# Patient Record
Sex: FEMALE | Race: WHITE | URBAN - METROPOLITAN AREA
[De-identification: names, ages, dates, MRNs, and addresses within clinical notes are randomized per-mention and may not be internally consistent; named-entity substitution may affect disease eponyms.]

---

## 2017-01-30 LAB
ABO + RH BLD: NORMAL
ABO + RH BLD: NORMAL
HBV SURFACE AG SERPL QL IA: NEGATIVE
HIV 1+2 AB+HIV1 P24 AG SERPL QL IA: NORMAL
RUBELLA ABY IGG: 4.47
RUBELLA ANTIBODY IGG QUANTITATIVE: NORMAL IU/ML
T PALLIDUM IGG SER QL: NORMAL

## 2017-05-18 ENCOUNTER — PRE VISIT (OUTPATIENT)
Dept: MATERNAL FETAL MEDICINE | Facility: CLINIC | Age: 32
End: 2017-05-18

## 2017-05-22 ENCOUNTER — OFFICE VISIT (OUTPATIENT)
Dept: MATERNAL FETAL MEDICINE | Facility: CLINIC | Age: 32
End: 2017-05-22
Attending: OBSTETRICS & GYNECOLOGY
Payer: COMMERCIAL

## 2017-05-22 ENCOUNTER — HOSPITAL ENCOUNTER (OUTPATIENT)
Dept: ULTRASOUND IMAGING | Facility: CLINIC | Age: 32
Discharge: HOME | End: 2017-05-22
Attending: OBSTETRICS & GYNECOLOGY | Admitting: OBSTETRICS & GYNECOLOGY
Payer: COMMERCIAL

## 2017-05-22 DIAGNOSIS — O26.90 PREGNANCY RELATED CONDITION, UNSPECIFIED TRIMESTER: ICD-10-CM

## 2017-05-22 PROCEDURE — 76811 OB US DETAILED SNGL FETUS: CPT

## 2017-05-22 NOTE — MR AVS SNAPSHOT
"              After Visit Summary   2017    Sadie Soto    MRN: [id number]           Patient Information     Date Of Birth          1985        Visit Information        Provider Department      2017 2:45 PM Hever Fuller MD [facility] Maternal Fetal Medicine [facility]        Today's Diagnoses     Fetal cardiac echogenic focus, not applicable or unspecified fetus    -  1       Follow-ups after your visit        Who to contact     If you have questions or need follow up information about today's clinic visit or your schedule please contact [facility] MATERNAL FETAL MEDICINE [facility] directly at [phone].  Normal or non-critical lab and imaging results will be communicated to you by Spotplexhart, letter or phone within 4 business days after the clinic has received the results. If you do not hear from us within 7 days, please contact the clinic through Spotplexhart or phone. If you have a critical or abnormal lab result, we will notify you by phone as soon as possible.  Submit refill requests through Sweet Cred or call your pharmacy and they will forward the refill request to us. Please allow 3 business days for your refill to be completed.          Additional Information About Your Visit        MyChart Information     Sweet Cred lets you send messages to your doctor, view your test results, renew your prescriptions, schedule appointments and more. To sign up, go to [email or web address] . Click on \"Log in\" on the left side of the screen, which will take you to the Welcome page. Then click on \"Sign up Now\" on the right side of the page.     You will be asked to enter the access code listed below, as well as some personal information. Please follow the directions to create your username and password.     Your access code is: [email or web address]  Expires: 2017  4:17 PM     Your access code will  in 90 days. If you need help or a new code, please call your [facility] clinic or [phone].        Care " EveryWhere ID     This is your Care EveryWhere ID. This could be used by other organizations to access your [facility] medical records  [id number]        Your Vitals Were     Last Period                   12/20/2016            Blood Pressure from Last 3 Encounters:   09/08/16 106/72   07/06/15 123/84    Weight from Last 3 Encounters:   09/08/16 67.1 kg (148 lb)   07/06/15 63.5 kg (140 lb)              Today, you had the following     No orders found for display       Primary Care Provider Office Phone # Fax #    Rachael Krista Shaw [phone]       [facility] [address] [facility] [address]        Thank you!     Thank you for choosing [facility]ERNAL FETAL [facility]IC[facility]  for your care. Our goal is always to provide you with excellent care. Hearing back from our patients is one way we can continue to improve our services. Please take a few minutes to complete the written survey that you may receive in the mail after your visit with us. Thank you!             Your Updated Medication List - Protect others around you: Learn how to safely use, store and throw away your medicines at www.disposemymeds.org.          This list is accurate as of: 5/22/17  4:17 PM.  Always use your most recent med list.                   Brand Name Dispense Instructions for use    ALLEGRA PO      Take by mouth 2 times daily       desoximetasone 0.25 % Oint ointment    TOPICORT    60 g    Apply topically 2 times daily       fluticasone 50 MCG/ACT spray    FLONASE     Spray 2 sprays into both nostrils daily       MONONESSA 0.25-35 MG-MCG per tablet   Generic drug:  norgestimate-ethinyl estradiol      TK 1 T PO QD

## 2017-06-03 ENCOUNTER — HEALTH MAINTENANCE LETTER (OUTPATIENT)
Age: 32
End: 2017-06-03

## 2017-08-29 LAB — GROUP B STREP PCR: NORMAL

## 2017-09-27 ENCOUNTER — HOSPITAL ENCOUNTER (INPATIENT)
Facility: CLINIC | Age: 32
LOS: 2 days | Discharge: HOME | End: 2017-09-29
Attending: OBSTETRICS & GYNECOLOGY | Admitting: OBSTETRICS & GYNECOLOGY
Payer: COMMERCIAL

## 2017-09-27 PROCEDURE — 12000029 ZZH R&B OB INTERMEDIATE

## 2017-09-27 PROCEDURE — 72200001 ZZH LABOR CARE VAGINAL DELIVERY SINGLE

## 2017-09-27 PROCEDURE — 25000125 ZZHC RX 250: Performed by: OBSTETRICS & GYNECOLOGY

## 2017-09-27 PROCEDURE — 99215 OFFICE O/P EST HI 40 MIN: CPT

## 2017-09-27 RX ORDER — SODIUM CHLORIDE, SODIUM LACTATE, POTASSIUM CHLORIDE, CALCIUM CHLORIDE 600; 310; 30; 20 MG/100ML; MG/100ML; MG/100ML; MG/100ML
INJECTION, SOLUTION INTRAVENOUS CONTINUOUS
Status: DISCONTINUED | OUTPATIENT
Start: 2017-09-27 | End: 2017-09-28

## 2017-09-27 RX ORDER — PRENATAL VIT/IRON FUM/FOLIC AC 27MG-0.8MG
1 TABLET ORAL DAILY
COMMUNITY

## 2017-09-27 RX ORDER — FENTANYL CITRATE 50 UG/ML
50-100 INJECTION, SOLUTION INTRAMUSCULAR; INTRAVENOUS
Status: DISCONTINUED | OUTPATIENT
Start: 2017-09-27 | End: 2017-09-28

## 2017-09-27 RX ORDER — METHYLERGONOVINE MALEATE 0.2 MG/ML
200 INJECTION INTRAVENOUS
Status: DISCONTINUED | OUTPATIENT
Start: 2017-09-27 | End: 2017-09-28

## 2017-09-27 RX ORDER — ONDANSETRON 2 MG/ML
4 INJECTION INTRAMUSCULAR; INTRAVENOUS EVERY 6 HOURS PRN
Status: DISCONTINUED | OUTPATIENT
Start: 2017-09-27 | End: 2017-09-28

## 2017-09-27 RX ORDER — OXYTOCIN 10 [USP'U]/ML
10 INJECTION, SOLUTION INTRAMUSCULAR; INTRAVENOUS
Status: DISCONTINUED | OUTPATIENT
Start: 2017-09-27 | End: 2017-09-28

## 2017-09-27 RX ORDER — OXYTOCIN/0.9 % SODIUM CHLORIDE 30/500 ML
100-340 PLASTIC BAG, INJECTION (ML) INTRAVENOUS CONTINUOUS PRN
Status: COMPLETED | OUTPATIENT
Start: 2017-09-27 | End: 2017-09-27

## 2017-09-27 RX ORDER — LIDOCAINE HYDROCHLORIDE 10 MG/ML
INJECTION, SOLUTION INFILTRATION; PERINEURAL
Status: DISCONTINUED
Start: 2017-09-27 | End: 2017-09-28 | Stop reason: HOSPADM

## 2017-09-27 RX ORDER — OXYTOCIN 10 [USP'U]/ML
INJECTION, SOLUTION INTRAMUSCULAR; INTRAVENOUS
Status: DISCONTINUED
Start: 2017-09-27 | End: 2017-09-28 | Stop reason: WASHOUT

## 2017-09-27 RX ORDER — ACETAMINOPHEN 325 MG/1
650 TABLET ORAL EVERY 4 HOURS PRN
Status: DISCONTINUED | OUTPATIENT
Start: 2017-09-27 | End: 2017-09-28

## 2017-09-27 RX ORDER — NALOXONE HYDROCHLORIDE 0.4 MG/ML
.1-.4 INJECTION, SOLUTION INTRAMUSCULAR; INTRAVENOUS; SUBCUTANEOUS
Status: DISCONTINUED | OUTPATIENT
Start: 2017-09-27 | End: 2017-09-28

## 2017-09-27 RX ORDER — IBUPROFEN 400 MG/1
800 TABLET, FILM COATED ORAL
Status: DISCONTINUED | OUTPATIENT
Start: 2017-09-27 | End: 2017-09-28

## 2017-09-27 RX ORDER — OXYCODONE AND ACETAMINOPHEN 5; 325 MG/1; MG/1
1 TABLET ORAL
Status: DISCONTINUED | OUTPATIENT
Start: 2017-09-27 | End: 2017-09-28

## 2017-09-27 RX ORDER — CARBOPROST TROMETHAMINE 250 UG/ML
250 INJECTION, SOLUTION INTRAMUSCULAR
Status: DISCONTINUED | OUTPATIENT
Start: 2017-09-27 | End: 2017-09-28

## 2017-09-27 RX ADMIN — OXYTOCIN-SODIUM CHLORIDE 0.9% IV SOLN 30 UNIT/500ML 340 ML/HR: 30-0.9/5 SOLUTION at 23:43

## 2017-09-27 NOTE — IP AVS SNAPSHOT
[address]    [place] [address]    Phone:  [phone]                                       After Visit Summary   9/27/2017    Sadie Soto    MRN: [id number]           After Visit Summary Signature Page     I have received my discharge instructions, and my questions have been answered. I have discussed any challenges I see with this plan with the nurse or doctor.    ..........................................................................................................................................  Patient/Patient Representative Signature      ..........................................................................................................................................  Patient Representative Print Name and Relationship to Patient    ..................................................               ................................................  Date                                            Time    ..........................................................................................................................................  Reviewed by Signature/Title    ...................................................              ..............................................  Date                                                            Time

## 2017-09-27 NOTE — IP AVS SNAPSHOT
MRN:[id number]                      After Visit Summary   9/27/2017    Sadie Soto    MRN: [id number]           Thank you!     Thank you for choosing [facility] for your care. Our goal is always to provide you with excellent care. Hearing back from our patients is one way we can continue to improve our services. Please take a few minutes to complete the written survey that you may receive in the mail after you visit with us. Thank you!        Patient Information     Date Of Birth          1985        About your hospital stay     You were admitted on:  September 27, 2017 You last received care in the:  [address]    You were discharged on:  September 29, 2017       Who to Call     For medical emergencies, please call 911.  For non-urgent questions about your medical care, please call your primary care provider or clinic, [phone]          Attending Provider     Provider Specialty    Jaime Rice MD OB/Gyn       Primary Care Provider Office Phone # Fax #    Rachael Krista Shaw [phone]      After Care Instructions     Activity       Your activity upon discharge: activity as tolerated            Diet       Follow this diet upon discharge: Orders Placed This Encounter      Room Service      Regular Diet Adult                  Follow-up Appointments     Follow-up and recommended labs and tests        Follow up with primary care provider,, within 6 wks, for hospital follow- up. No follow up labs or test are needed.                  Further instructions from your care team       Postpartum Vaginal Delivery Instructions    Activity       Ask family and friends for help when you need it.    Do not place anything in your vagina for 6 weeks.    You are not restricted on other activities, but take it easy for a few weeks to allow your body to recover from delivery.  You are able to do any activities you feel up to that  point.    No driving until you have stopped taking your pain medications (usually two weeks after delivery).     Call your health care provider if you have any of these symptoms:       Increased pain, swelling, redness, or fluid around your stiches from an episiotomy or perineal tear.    A fever above 100.4 F (38 C) with or without chills when placing a thermometer under your tongue.    You soak a sanitary pad with blood within 1 hour, or you see blood clots larger than a golf ball.    Bleeding that lasts more than 6 weeks.    Vaginal discharge that smells bad.    Severe pain, cramping or tenderness in your lower belly area.    A need to urinate more frequently (use the toilet more often), more urgently (use the toilet very quickly), or it burns when you urinate.    Nausea and vomiting.    Redness, swelling or pain around a vein in your leg.    Problems breastfeeding or a red or painful area on your breast.    Chest pain and cough or are gasping for air.    Problems coping with sadness, anxiety, or depression.  If you have any concerns about hurting yourself or the baby, call your provider immediately.     You have questions or concerns after you return home.     Keep your hands clean:  Always wash your hands before touching your perineal area and stitches.  This helps reduce your risk of infection.  If your hands aren't dirty, you may use an alcohol hand-rub to clean your hands. Keep your nails clean and short.        Pending Results     No orders found from 9/25/2017 to 9/28/2017.            Statement of Approval     Ordered          09/28/17 0714  I have reviewed and agree with all the recommendations and orders detailed in this document.  EFFECTIVE NOW     Approved and electronically signed by:  Jaime Rice MD             Admission Information     Date & Time Provider Department Dept. Phone    9/27/2017 Jaime Rice MD [address]  "[phone]      Your Vitals Were     Blood Pressure Temperature Respirations Last Period          118/83 98.5  F (36.9  C) (Oral) 16 2016        Orckestra Information     Orckestra lets you send messages to your doctor, view your test results, renew your prescriptions, schedule appointments and more. To sign up, go to [email or web address] . Click on \"Log in\" on the left side of the screen, which will take you to the Welcome page. Then click on \"Sign up Now\" on the right side of the page.     You will be asked to enter the access code listed below, as well as some personal information. Please follow the directions to create your username and password.     Your access code is: [email or web address]  Expires: 2017 10:48 AM     Your access code will  in 90 days. If you need help or a new code, please call your [facility] clinic or [phone].        Care EveryWhere ID     This is your Care EveryWhere ID. This could be used by other organizations to access your [facility] medical records  [id number]        Equal Access to Services     JAROD ROMAN : Hadii jorje Diana, wamazinda clement, qaybta kaalmamaged stanley, nicki esteban . So [facility] [phone].    ATENCIÓN: Si habla español, tiene a cramer disposición servicios gratuitos de asistencia lingüística. Tyrel al [phone].    We comply with applicable federal civil rights laws and [place] laws. We do not discriminate on the basis of race, color, national origin, age, disability sex, sexual orientation or gender identity.               Review of your medicines      UNREVIEWED medicines. Ask your doctor about these medicines        Dose / Directions    prenatal multivitamin plus iron 27-0.8 MG Tabs per tablet        Dose:  1 tablet   Take 1 tablet by mouth daily   Refills:  0         CONTINUE these medicines which have NOT CHANGED        Dose / Directions    ALLEGRA PO        Take by mouth 2 times daily   Refills:  0       " desoximetasone 0.25 % Oint ointment   Commonly known as:  TOPICORT   Used for:  Flexural atopic dermatitis        Apply topically 2 times daily   Quantity:  60 g   Refills:  1       fluticasone 50 MCG/ACT spray   Commonly known as:  FLONASE        Dose:  2 spray   Spray 2 sprays into both nostrils daily   Refills:  0         STOP taking     MONONESSA 0.25-35 MG-MCG per tablet   Generic drug:  norgestimate-ethinyl estradiol                    Protect others around you: Learn how to safely use, store and throw away your medicines at www.disposemymeds.org.             Medication List: This is a list of all your medications and when to take them. Check marks below indicate your daily home schedule. Keep this list as a reference.      Medications           Morning Afternoon Evening Bedtime As Needed    ALLEGRA PO   Take by mouth 2 times daily                                desoximetasone 0.25 % Oint ointment   Commonly known as:  TOPICORT   Apply topically 2 times daily                                fluticasone 50 MCG/ACT spray   Commonly known as:  FLONASE   Spray 2 sprays into both nostrils daily                                prenatal multivitamin plus iron 27-0.8 MG Tabs per tablet   Take 1 tablet by mouth daily

## 2017-09-28 LAB
BLOOD BANK CMNT PATIENT-IMP: NORMAL
BLOOD BANK CMNT PATIENT-IMP: NORMAL

## 2017-09-28 PROCEDURE — 0HQ9XZZ REPAIR PERINEUM SKIN, EXTERNAL APPROACH: ICD-10-PCS | Performed by: OBSTETRICS & GYNECOLOGY

## 2017-09-28 PROCEDURE — 0KQM0ZZ REPAIR PERINEUM MUSCLE, OPEN APPROACH: ICD-10-PCS | Performed by: OBSTETRICS & GYNECOLOGY

## 2017-09-28 PROCEDURE — 25000132 ZZH RX MED GY IP 250 OP 250 PS 637: Performed by: OBSTETRICS & GYNECOLOGY

## 2017-09-28 PROCEDURE — 12000037 ZZH R&B POSTPARTUM INTERMEDIATE

## 2017-09-28 RX ORDER — MISOPROSTOL 200 UG/1
400 TABLET ORAL
Status: DISCONTINUED | OUTPATIENT
Start: 2017-09-28 | End: 2017-09-29 | Stop reason: HOSPADM

## 2017-09-28 RX ORDER — OXYTOCIN 10 [USP'U]/ML
10 INJECTION, SOLUTION INTRAMUSCULAR; INTRAVENOUS
Status: DISCONTINUED | OUTPATIENT
Start: 2017-09-28 | End: 2017-09-29 | Stop reason: HOSPADM

## 2017-09-28 RX ORDER — IBUPROFEN 400 MG/1
400-800 TABLET, FILM COATED ORAL EVERY 6 HOURS PRN
Status: DISCONTINUED | OUTPATIENT
Start: 2017-09-28 | End: 2017-09-29 | Stop reason: HOSPADM

## 2017-09-28 RX ORDER — AMOXICILLIN 250 MG
1-2 CAPSULE ORAL 2 TIMES DAILY
Status: DISCONTINUED | OUTPATIENT
Start: 2017-09-28 | End: 2017-09-29 | Stop reason: HOSPADM

## 2017-09-28 RX ORDER — BISACODYL 10 MG
10 SUPPOSITORY, RECTAL RECTAL DAILY PRN
Status: DISCONTINUED | OUTPATIENT
Start: 2017-09-30 | End: 2017-09-29 | Stop reason: HOSPADM

## 2017-09-28 RX ORDER — OXYTOCIN/0.9 % SODIUM CHLORIDE 30/500 ML
100 PLASTIC BAG, INJECTION (ML) INTRAVENOUS CONTINUOUS
Status: DISCONTINUED | OUTPATIENT
Start: 2017-09-28 | End: 2017-09-29 | Stop reason: HOSPADM

## 2017-09-28 RX ORDER — ACETAMINOPHEN 325 MG/1
650 TABLET ORAL EVERY 4 HOURS PRN
Status: DISCONTINUED | OUTPATIENT
Start: 2017-09-28 | End: 2017-09-29 | Stop reason: HOSPADM

## 2017-09-28 RX ORDER — NALOXONE HYDROCHLORIDE 0.4 MG/ML
.1-.4 INJECTION, SOLUTION INTRAMUSCULAR; INTRAVENOUS; SUBCUTANEOUS
Status: DISCONTINUED | OUTPATIENT
Start: 2017-09-28 | End: 2017-09-29 | Stop reason: HOSPADM

## 2017-09-28 RX ORDER — OXYTOCIN/0.9 % SODIUM CHLORIDE 30/500 ML
340 PLASTIC BAG, INJECTION (ML) INTRAVENOUS CONTINUOUS PRN
Status: DISCONTINUED | OUTPATIENT
Start: 2017-09-28 | End: 2017-09-29 | Stop reason: HOSPADM

## 2017-09-28 RX ADMIN — ACETAMINOPHEN 650 MG: 325 TABLET, FILM COATED ORAL at 11:37

## 2017-09-28 RX ADMIN — IBUPROFEN 800 MG: 400 TABLET ORAL at 17:42

## 2017-09-28 RX ADMIN — IBUPROFEN 800 MG: 400 TABLET ORAL at 06:30

## 2017-09-28 RX ADMIN — ACETAMINOPHEN 650 MG: 325 TABLET, FILM COATED ORAL at 15:09

## 2017-09-28 RX ADMIN — SENNOSIDES AND DOCUSATE SODIUM 2 TABLET: 8.6; 5 TABLET ORAL at 19:10

## 2017-09-28 RX ADMIN — IBUPROFEN 800 MG: 400 TABLET ORAL at 00:32

## 2017-09-28 RX ADMIN — ACETAMINOPHEN 650 MG: 325 TABLET, FILM COATED ORAL at 00:31

## 2017-09-28 RX ADMIN — IBUPROFEN 800 MG: 400 TABLET ORAL at 11:39

## 2017-09-28 RX ADMIN — SENNOSIDES AND DOCUSATE SODIUM 1 TABLET: 8.6; 5 TABLET ORAL at 11:37

## 2017-09-28 RX ADMIN — ACETAMINOPHEN 650 MG: 325 TABLET, FILM COATED ORAL at 06:31

## 2017-09-28 RX ADMIN — ACETAMINOPHEN 650 MG: 325 TABLET, FILM COATED ORAL at 19:10

## 2017-09-28 RX ADMIN — SENNOSIDES AND DOCUSATE SODIUM 1 TABLET: 8.6; 5 TABLET ORAL at 11:38

## 2017-09-28 RX ADMIN — ACETAMINOPHEN 650 MG: 325 TABLET, FILM COATED ORAL at 23:05

## 2017-09-28 NOTE — PROGRESS NOTES
Del  Note   liveborn male infant over a small 2nd degree midline tear, and 1st degree left labial tear, repaired  ebl 300 c.c.

## 2017-09-28 NOTE — PLAN OF CARE
Data: Sadie Soto transferred to post partum unit via wheelchair at 0230. Baby transferred via mother's arms.  Action: Receiving unit notified of transfer: Yes. Patient and family notified of room change. Report given to Aiden SHAFFER at 0235. Belongings sent to receiving unit. Accompanied by Registered Nurse. Oriented patient to surroundings. Call light within reach. ID bands double-checked with receiving RN.  Response: Patient tolerated transfer and is stable.

## 2017-09-28 NOTE — PLAN OF CARE
Problem: Patient Care Overview  Goal: Plan of Care/Patient Progress Review  Outcome: Improving  Pt arrived from L&D at 240, oriented to room and educated about infant safety, VSS, working on breast feeding q 2-3 hrs, pain controlled with heat packs Tylenol and Ibuprofen, states satisfaction with pain management, up independently with no complaints of dizziness, voiding adequately,  at bedside, interacting and bonding well with infant, encouraged to call with questions, call light within reach, will continue to monitor.

## 2017-09-28 NOTE — LACTATION NOTE
Initial Lactation visit. Hand out given. Recommend unlimited, frequent breast feedings: At least 8 - 12 times every 24 hours. Avoid pacifiers and supplementation with formula unless medically indicated. Explained benefits of holding baby skin on skin to help promote better breastfeeding outcomes. Infant latched and fed after delivery.  L nipples has multiple bruises from a poor latch.  Nipples are flatter and invert with compression.  Shield introduced on R side and baby latched well and did have a good suck but tired out quickly.  Reviewed normal feeding patterns and second night cluster feeding.  Will revisit as needed.    Mariela King RN, IBCLC

## 2017-09-28 NOTE — PLAN OF CARE
2310- patient arrived from [facility] with  and opersonal belongings. Patient complete, MD already notified, Dr. Howard arrived at 2322 then Dr. Rice arrived and took over delivery. Patient PIV began at 2330, LR began. 1:1 RN care, late decelerations noted with contractions, recovered in between. Pushed with contractions beginning at 2333, liveborn infant male born at 2340, apgars 9,9, no complications with bleeding following delivery. See delivery summary for details.     0215- ambulated patient to bathroom, no c/o dizziness or light headedness. Able to have small void x 1. No clots, bleeding WNL. Prepared for transfer to Post partum.

## 2017-09-28 NOTE — PLAN OF CARE
2300.  Pt arrives to MAC, ferny, 40 weeks, states she started david an hour ago, but didn't feel well all day.  Also states that her bag of loza broke on her way up to [facility] in wheelchair, copious amounts of clear fluid noted.   Pt stands up, leaning over the bed, feeling like she needs to push, pt into bed.  SVE at 2305 reveals pt to be completely dilated, +1 station.  Page to  to come in for imminent delivery.  Pt moved to room 218, via bed.    2310. Pt in room 218, report to Emiliano SHAFFER.

## 2017-09-28 NOTE — L&D DELIVERY NOTE
Sadie Fremean presented at term in labor, presented in the triage area already fully dilated, progressed quickly to delivery.  There were variable decelerations present during this stage.  The baby delivered by the vertex over a small second-degree midline perineal tear with a right labial periurethral first-degree tear.  Local anesthetic was utilized and these areas were repaired with 3-0 chromic catgut suture in the usual manner.  The placenta was delivered by controlled cord traction.  Total estimated blood loss for the delivery approximately 300 mL.  The patient and infant left the delivery suite in good condition.         ETELVINA SWEENEY MD             D: 2017 00:10   T: 2017 13:59   MT: TS      Name:     SADIE FREEMAN   MRN:      [id number]        Account:        [id number]   :      1985           Delivery Date:  2017      Document: [id number]

## 2017-09-28 NOTE — PROGRESS NOTES
Pp#1 afeb  Comfortable on NSAIDs  Ambulation encouraged  Good early recovery  Will discharge for tomorrow

## 2017-09-29 VITALS — TEMPERATURE: 98.5 F | SYSTOLIC BLOOD PRESSURE: 118 MMHG | DIASTOLIC BLOOD PRESSURE: 83 MMHG | RESPIRATION RATE: 16 BRPM

## 2017-09-29 LAB — HGB BLD-MCNC: 11.5 G/DL (ref 11.7–15.7)

## 2017-09-29 PROCEDURE — 36415 COLL VENOUS BLD VENIPUNCTURE: CPT | Performed by: OBSTETRICS & GYNECOLOGY

## 2017-09-29 PROCEDURE — 85018 HEMOGLOBIN: CPT | Performed by: OBSTETRICS & GYNECOLOGY

## 2017-09-29 PROCEDURE — 90686 IIV4 VACC NO PRSV 0.5 ML IM: CPT | Performed by: OBSTETRICS & GYNECOLOGY

## 2017-09-29 PROCEDURE — 25000128 H RX IP 250 OP 636: Performed by: OBSTETRICS & GYNECOLOGY

## 2017-09-29 PROCEDURE — 25000132 ZZH RX MED GY IP 250 OP 250 PS 637: Performed by: OBSTETRICS & GYNECOLOGY

## 2017-09-29 RX ADMIN — IBUPROFEN 400 MG: 400 TABLET ORAL at 12:46

## 2017-09-29 RX ADMIN — SENNOSIDES AND DOCUSATE SODIUM 1 TABLET: 8.6; 5 TABLET ORAL at 09:38

## 2017-09-29 RX ADMIN — ACETAMINOPHEN 650 MG: 325 TABLET, FILM COATED ORAL at 03:48

## 2017-09-29 RX ADMIN — IBUPROFEN 800 MG: 400 TABLET ORAL at 06:30

## 2017-09-29 RX ADMIN — ACETAMINOPHEN 650 MG: 325 TABLET, FILM COATED ORAL at 09:38

## 2017-09-29 RX ADMIN — IBUPROFEN 800 MG: 400 TABLET ORAL at 00:02

## 2017-09-29 RX ADMIN — INFLUENZA A VIRUS A/MICHIGAN/45/2015 X-275 (H1N1) ANTIGEN (FORMALDEHYDE INACTIVATED), INFLUENZA A VIRUS A/HONG KONG/4801/2014 X-263B (H3N2) ANTIGEN (FORMALDEHYDE INACTIVATED), INFLUENZA B VIRUS B/PHUKET/3073/2013 ANTIGEN (FORMALDEHYDE INACTIVATED), AND INFLUENZA B VIRUS B/BRISBANE/60/2008 ANTIGEN (FORMALDEHYDE INACTIVATED) 0.5 ML: 15; 15; 15; 15 INJECTION, SUSPENSION INTRAMUSCULAR at 10:56

## 2017-09-29 NOTE — PLAN OF CARE
Problem: Postpartum (Vaginal Delivery) (Adult,Obstetrics,Pediatric)  Goal: Signs and Symptoms of Listed Potential Problems Will be Absent, Minimized or Managed (Postpartum)  Signs and symptoms of listed potential problems will be absent, minimized or managed by discharge/transition of care (reference Postpartum (Vaginal Delivery) (Adult,Obstetrics,Pediatric) CPG).   Outcome: Adequate for Discharge Date Met:  09/29/17  Patient up ad sharmaine and tolerating well.  Taking Ibuprofen and Tylenol for discomfort.  Fundus firm with no free flow or clots.  Doing well with baby cares and breastfeedings.  Planning on discharge today.  Expresses understanding of discharge instructions and follow-up appt

## 2017-09-29 NOTE — LACTATION NOTE
Follow up visit.  Infant feeding well this morning.  Latched and feeding well at time of visit with shield.  Visible colostrum in shield after feeding.  Encouraged Sadie to be more assertive in waking infant if he is sleepy.  He was sleepy overnight and did not feed well.  Reviewed signs infant is getting enough and outpatient lactation resources for follow up and encouraged pt to make an appointment for next week with shield use.  Discussed cluster feeding and pumping if needing to when milk comes in to avoid engorgement.    Mraiela King  RN, IBCLC

## 2017-09-29 NOTE — PLAN OF CARE
Problem: Patient Care Overview  Goal: Plan of Care/Patient Progress Review  Outcome: No Change  Pt taking tylenol and ibuprofen for pain, using ice and tucks at times, voiding adequate amounts, tolerating diet. Will continue to monitor.

## 2017-09-29 NOTE — PLAN OF CARE
Problem: Patient Care Overview  Goal: Plan of Care/Patient Progress Review  Outcome: Improving  Pt on pathway. Pain controlled well with Ibuprofen and Tylenol. Voiding in good amounts. Breastfeeding baby going well.

## 2017-09-29 NOTE — PROGRESS NOTES
PPD#2    S: Doing well, no concerns.    O: /70  Temp 98.1  F (36.7  C) (Oral)  Resp 16  LMP 2016  Breastfeeding? Unknown  Gen - NAD  Abd - FF, NT  Ext - NT    A/P: 33yo PPD#2 s/p   1. Routine cares  2. Home today, instructions reviewed    Andree Pelaez

## 2018-04-11 ENCOUNTER — OFFICE VISIT (OUTPATIENT)
Dept: FAMILY MEDICINE | Facility: CLINIC | Age: 33
End: 2018-04-11

## 2018-04-11 VITALS
BODY MASS INDEX: 23.98 KG/M2 | WEIGHT: 152.8 LBS | HEART RATE: 76 BPM | SYSTOLIC BLOOD PRESSURE: 118 MMHG | OXYGEN SATURATION: 98 % | DIASTOLIC BLOOD PRESSURE: 76 MMHG | RESPIRATION RATE: 12 BRPM | HEIGHT: 67 IN | TEMPERATURE: 98.2 F

## 2018-04-11 DIAGNOSIS — J06.9 UPPER RESPIRATORY TRACT INFECTION, UNSPECIFIED TYPE: Primary | ICD-10-CM

## 2018-04-11 DIAGNOSIS — J01.20 ACUTE NON-RECURRENT ETHMOIDAL SINUSITIS: ICD-10-CM

## 2018-04-11 PROCEDURE — 99213 OFFICE O/P EST LOW 20 MIN: CPT | Performed by: FAMILY MEDICINE

## 2018-04-11 RX ORDER — AMOXICILLIN 500 MG/1
1000 CAPSULE ORAL 2 TIMES DAILY
Qty: 40 CAPSULE | Refills: 0 | Status: ON HOLD | OUTPATIENT
Start: 2018-04-11 | End: 2019-11-23

## 2018-04-11 NOTE — MR AVS SNAPSHOT
"              After Visit Summary   4/11/2018    Sadie Soto    MRN: [id number]           Patient Information     Date Of Birth          1985        Visit Information        Provider Department      4/11/2018 11:00 AM Rachael Shaw MD [facility]        Today's Diagnoses     Upper respiratory tract infection, unspecified type    -  1    Acute non-recurrent ethmoidal sinusitis           Follow-ups after your visit        Who to contact     If you have questions or need follow up information about today's clinic visit or your schedule please contact [facility] directly at [phone].  Normal or non-critical lab and imaging results will be communicated to you by Aquaback Technologieshart, letter or phone within 4 business days after the clinic has received the results. If you do not hear from us within 7 days, please contact the clinic through BeatTheBushest or phone. If you have a critical or abnormal lab result, we will notify you by phone as soon as possible.  Submit refill requests through [employer] or call your pharmacy and they will forward the refill request to us. Please allow 3 business days for your refill to be completed.          Additional Information About Your Visit        MyChart Information     [employer] gives you secure access to your electronic health record. If you see a primary care provider, you can also send messages to your care team and make appointments. If you have questions, please call your primary care clinic.  If you do not have a primary care provider, please call [phone] and they will assist you.        Care EveryWhere ID     This is your Care EveryWhere ID. This could be used by other organizations to access your [facility] medical records  [id number]        Your Vitals Were     Pulse Temperature Respirations Height Pulse Oximetry Breastfeeding?    76 98.2  F (36.8  C) (Oral) 12 1.689 m (5' 6.5\") 98% Yes    BMI (Body Mass Index)                   24.29 kg/m2     "        Blood Pressure from Last 3 Encounters:   04/11/18 118/76   09/29/17 118/83   09/08/16 106/72    Weight from Last 3 Encounters:   04/11/18 69.3 kg (152 lb 12.8 oz)   09/08/16 67.1 kg (148 lb)   07/06/15 63.5 kg (140 lb)              Today, you had the following     No orders found for display         Today's Medication Changes          These changes are accurate as of 4/11/18 11:12 PM.  If you have any questions, ask your nurse or doctor.               Start taking these medicines.        Dose/Directions    amoxicillin 500 MG capsule   Commonly known as:  AMOXIL   Used for:  Acute non-recurrent ethmoidal sinusitis   Started by:  Rachael Shaw MD        Dose:  1000 mg   Take 2 capsules (1,000 mg) by mouth 2 times daily   Quantity:  40 capsule   Refills:  0            Where to get your medicines      These medications were sent to [facility] [address] [place] [address]     Phone:  [phone]     amoxicillin 500 MG capsule                Primary Care Provider Office Phone # Fax #    Rachael Shaw [phone] [address] [place] [address]        Equal Access to Services     JAROD ROMAN AH: Hadii jorje ku hadasho Soomaali, waaxda luqadaha, qaybta kaalmada adeegyada, waxay idiin hayaan cuong cabrera. So [facility] [phone].    ATENCIÓN: Si habla español, tiene a cramer disposición servicios gratuitos de asistencia lingüística. Llame al [phone].    We comply with applicable federal civil rights laws and [place] laws. We do not discriminate on the basis of race, color, national origin, age, disability, sex, sexual orientation, or gender identity.            Thank you!     Thank you for choosing [facility]  for your care. Our goal is always to provide you with excellent care. Hearing back from our patients is one way we can continue to  improve our services. Please take a few minutes to complete the written survey that you may receive in the mail after your visit with us. Thank you!             Your Updated Medication List - Protect others around you: Learn how to safely use, store and throw away your medicines at www.disposemymeds.org.          This list is accurate as of 4/11/18 11:12 PM.  Always use your most recent med list.                   Brand Name Dispense Instructions for use Diagnosis    ALLEGRA PO      Take by mouth 2 times daily        amoxicillin 500 MG capsule    AMOXIL    40 capsule    Take 2 capsules (1,000 mg) by mouth 2 times daily    Acute non-recurrent ethmoidal sinusitis       desoximetasone 0.25 % Oint ointment    TOPICORT    60 g    Apply topically 2 times daily    Flexural atopic dermatitis       fluticasone 50 MCG/ACT spray    FLONASE     Spray 2 sprays into both nostrils daily        prenatal multivitamin plus iron 27-0.8 MG Tabs per tablet      Take 1 tablet by mouth daily        UNKNOWN TO PATIENT      daily Mini Birth control pill

## 2018-04-11 NOTE — PROGRESS NOTES
"Problem(s) Oriented visit        SUBJECTIVE:                                                    Sadie Soto is a 33 year old female who presents to clinic today for ongoing congestion and sore throat that start 8 days ago. The phlegm is very thick and makes her feel like she is choking. No face pain. She is having headaches. She is coughing. No fever.     She works as an  and cannot have facial pressure and incontrolled cough when she is flying.       Problem list, Medication list, Allergies, and Medical/Social/Surgical histories reviewed in [facility] and updated as appropriate.   Additional history: as documented    ROS:  5 point ROS completed and negative except noted above, including Gen, CV, Resp, GI, MS      Histories:   Patient Active Problem List   Diagnosis     Contact dermatitis and eczema     Indication for care in labor or delivery      (normal spontaneous vaginal delivery)     Postpartum care and examination immediately after delivery     Past Surgical History:   Procedure Laterality Date     HC TOOTH EXTRACTION W/FORCEP         Social History   Substance Use Topics     Smoking status: Never Smoker     Smokeless tobacco: Never Used     Alcohol use No      Comment: social     Family History   Problem Relation Age of Onset     Neurologic Disorder Paternal Grandfather      ALS     Brain Cancer Maternal Grandmother      Colon Cancer Cousin       age 28           OBJECTIVE:                                                    /76  Pulse 76  Temp 98.2  F (36.8  C) (Oral)  Resp 12  Ht 1.689 m (5' 6.5\")  Wt 69.3 kg (152 lb 12.8 oz)  SpO2 98%  Breastfeeding? Yes  BMI 24.29 kg/m2  Body mass index is 24.29 kg/(m^2).   GENERAL APPEARANCE: Alert, no acute distress  EYES: PERRL, EOM normal, conjunctiva and lids normal  HENT: right TM clear fluid behind TM, left TM clear fluid behind TM, nose mucosal erythema, mucosal edema, throat/mouth:mild erythema,  Posterior pharyngeal " cobblestoning is noted.  NECK: No adenopathy,masses or thyromegaly  RESP: lungs clear to auscultation   CV: normal rate, regular rhythm, no murmur or gallop  NEURO: Alert, oriented, speech and mentation normal  PSYCH: mentation appears normal, affect and mood normal   Labs Resulted Today:   Results for orders placed or performed during the hospital encounter of 09/27/17   Anti Treponema   Result Value Ref Range    Treponema pallidum Antibody neg    Hepatitis B surface antigen   Result Value Ref Range    Hep B Surface Agn negative    HIV Antigen Antibody Combo   Result Value Ref Range    HIV Antigen Antibody Combo neg    Rubella Antibody IgG Quantitative   Result Value Ref Range    Rubella GRECIA IgG 4.47     Rubella Antibody IgG Quantitative immune IU/mL   Hemoglobin   Result Value Ref Range    Hemoglobin 11.5 (L) 11.7 - 15.7 g/dL   ABO and Rh   Result Value Ref Range    ABO AB     RH(D) Neg    Rho (D) immune globulin (RhoGam) Lab Study   Result Value Ref Range    Rhogam Order Order received    Rh negative immune globulin study   Result Value Ref Range    Blood Bank Comment       Not suitable for Rh Immune Globulin  BABY IS RH NEGATIVE     Group B strep PCR   Result Value Ref Range    Group B Strep PCR neg      ASSESSMENT/PLAN:                                                        Sadie was seen today for cough and forms.    Diagnoses and all orders for this visit:    Upper respiratory tract infection, unspecified type    Acute non-recurrent ethmoidal sinusitis  -     amoxicillin (AMOXIL) 500 MG capsule; Take 2 capsules (1,000 mg) by mouth 2 times daily    Required forms are filled out for her employer. She will return to work when face pain and congestion improve.     There are no Patient Instructions on file for this visit.    The following health maintenance items are reviewed in Epic and correct as of today:  Health Maintenance   Topic Date Due     TETANUS IMMUNIZATION (SYSTEM ASSIGNED)  01/05/2003     PAP  SCREENING Q3 YR (SYSTEM ASSIGNED)  01/05/2006     INFLUENZA VACCINE (SYSTEM ASSIGNED)  09/01/2018       Rachael Shaw MD  [facility]– [place][note 2]  [phone]    For any issues my office # is [phone]

## 2019-05-31 LAB
ABO + RH BLD: NORMAL
ABO + RH BLD: NORMAL
BLD GP AB SCN SERPL QL: NORMAL
HBV SURFACE AG SERPL QL IA: NORMAL
HIV 1+2 AB+HIV1 P24 AG SERPL QL IA: NON REACTIVE
RUBELLA ABY IGG: NORMAL

## 2019-07-19 ENCOUNTER — TRANSFERRED RECORDS (OUTPATIENT)
Dept: HEALTH INFORMATION MANAGEMENT | Facility: CLINIC | Age: 34
End: 2019-07-19

## 2019-07-19 ENCOUNTER — MEDICAL CORRESPONDENCE (OUTPATIENT)
Dept: HEALTH INFORMATION MANAGEMENT | Facility: CLINIC | Age: 34
End: 2019-07-19

## 2019-07-19 ENCOUNTER — TRANSCRIBE ORDERS (OUTPATIENT)
Dept: MATERNAL FETAL MEDICINE | Facility: CLINIC | Age: 34
End: 2019-07-19

## 2019-07-19 DIAGNOSIS — O26.90 PREGNANCY RELATED CONDITION, ANTEPARTUM: Primary | ICD-10-CM

## 2019-08-09 ENCOUNTER — OFFICE VISIT (OUTPATIENT)
Dept: FAMILY MEDICINE | Facility: CLINIC | Age: 34
End: 2019-08-09

## 2019-08-09 VITALS
BODY MASS INDEX: 24.48 KG/M2 | DIASTOLIC BLOOD PRESSURE: 62 MMHG | SYSTOLIC BLOOD PRESSURE: 100 MMHG | WEIGHT: 154 LBS | RESPIRATION RATE: 16 BRPM | HEART RATE: 94 BPM | OXYGEN SATURATION: 97 %

## 2019-08-09 DIAGNOSIS — J06.9 UPPER RESPIRATORY TRACT INFECTION, UNSPECIFIED TYPE: Primary | ICD-10-CM

## 2019-08-09 PROCEDURE — 99213 OFFICE O/P EST LOW 20 MIN: CPT | Performed by: NURSE PRACTITIONER

## 2019-08-09 NOTE — LETTER
August 9, 2019      Sadie Soto  [address] [place] [address]        To Whom It May Concern:    Sadie Soto  was seen on 8/9/2019.  Please excuse her until she free from fever for 24 hours, she is unable to fulfil job duties due to illness.        Sincerely,        Blanka Del Rio NP

## 2019-08-09 NOTE — PATIENT INSTRUCTIONS
Wait to hear form your company to see if you are able to take OTC Zyrtec. If you are able to take zyrtec please take 10 mg daily. It may take 1 to 2 weeks to see the full benefits of this medication.

## 2019-08-09 NOTE — PROGRESS NOTES
"Subjective     Sadie Soto is a 34 year old female who presents to clinic today for the following health issues: Allergy symptoms. Pt is 15 weeks pregnant.     HPI   ENT Symptoms             Symptoms: cc Present Absent Comment   Fever/Chills       Fatigue       Muscle Aches       Eye Irritation  yes     Sneezing       Nasal Darron/Drg  yes     Sinus Pressure/Pain  yes     Loss of smell       Dental pain       Sore Throat       Swollen Glands       Ear Pain/Fullness  yes     Cough  yes     Wheeze       Chest Pain       Shortness of breath       Rash       Other         Symptom duration:  July 12 after picking weeds in yard   Symptom severity:  Moderate   Treatments tried:  Claritin, natural nasal spray.   Contacts:  None       {additonal problems for provider to add (Optional):849482}    {HIST REVIEW/ LINKS 2 (Optional):507172}    Reviewed and updated as needed this visit by Provider         Review of Systems   {ROS COMP (Optional):486137}      Objective    There were no vitals taken for this visit.  There is no height or weight on file to calculate BMI.  Physical Exam   {Exam List (Optional):060742}    {Diagnostic Test Results (Optional):029550::\"Diagnostic Test Results:\",\"Labs reviewed in Epic\"}        {PROVIDER CHARTING PREFERENCE:559863}    "

## 2019-08-12 NOTE — PROGRESS NOTES
Sadie Soto is a 34 year old female who presents to clinic today for the following health issues: Allergy symptoms. Pt is 15 weeks pregnant.      HPI   ENT Symptoms                Symptoms:   cc Present Absent Comment   Fever/Chills             Fatigue           Muscle Aches           Eye Irritation   yes       Sneezing           Nasal Darron/Drg   yes       Sinus Pressure/Pain   yes       Loss of smell           Dental pain           Sore Throat           Swollen Glands           Ear Pain/Fullness   yes       Cough   yes       Wheeze           Chest Pain           Shortness of breath           Rash           Other              Symptom duration:  July 12 after picking weeds in yard   Symptom severity:  Moderate   Treatments tried:  Claritin, natural nasal spray.   Contacts:  None      Problem(s) Oriented visit           SUBJECTIVE:                                                    Sadie Soto is a 34 year old female who presents to clinic today for the following health issues :  Red itchy eyes, rhinorrhea, dry cough, congestion and orbital eczema (believes it was a sensitivity reaction to new lotion rather than allergies)   Sadie is a  and so has a very limited list of medications she can take for allergies. She is 15 weeks pregnant which further limits her medication options.     Problem list, Medication list, Allergies, and Medical/Social/Surgical histories reviewed in [facility] and updated as appropriate.   Additional history: as documented     ROS:  General:  NEGATIVE for fever, chills, change in weight HEENT:  No changes in hearing or vision, no nose bleeds or other nasal problems, Negative for frequent or significant headaches, Eyes Positive for redness and itching, Nose Positive for congestion and rhinorrhea and Mouth &Throat Positive for post nasal drip CV:  NEGATIVE for chest pain, palpitations or peripheral edema Resp:  NEGATIVE for significant cough or SOB Heme/immune/allergy: No  history of bleeding or clotting problems or anemia. Skin: No rashes,worrisome lesions or skin problems, itching, rash on eye lids and redness around eyes     Histories:       Patient Active Problem List   Diagnosis     Contact dermatitis and eczema     Indication for care in labor or delivery      (normal spontaneous vaginal delivery)     Postpartum care and examination immediately after delivery     Past Surgical History           Past Surgical History:   Procedure Laterality Date     HC TOOTH EXTRACTION W/FORCEP              Social History            Tobacco Use     Smoking status: Never Smoker     Smokeless tobacco: Never Used   Substance Use Topics     Alcohol use: No       Alcohol/week: 2.4 oz       Types: 4 Standard drinks or equivalent per week       Comment: social     Family History         Family History   Problem Relation Age of Onset     Neurologic Disorder Paternal Grandfather           ALS     Brain Cancer Maternal Grandmother       Colon Cancer Cousin            age 28                OBJECTIVE:                                                    /62   Pulse 94   Resp 16   Wt 69.9 kg (154 lb)   SpO2 97%   BMI 24.48 kg/m    Body mass index is 24.48 kg/m .   GENERAL: some distress, pale and fatigued  EYES: Eyes grossly normal to inspection, EOMI, visual fields normal and eyelids- eczema   HENT: normal cephalic/atraumatic, ear canals and TM's normal, nose and mouth without ulcers or lesions, rhinorrhea clear, oropharynx clear and oral mucous membranes moist  NECK: no adenopathy, no asymmetry, masses, or scars and thyroid normal to palpation  RESP: lungs clear to auscultation - no rales, rhonchi or wheezes  CV: regular rate and rhythm, normal S1 S2, no S3 or S4, no murmur, click or rub, no peripheral edema and peripheral pulses strong  SKIN: eczema over eyelids   PSYCH: mentation appears normal, affect normal/bright      ASSESSMENT/PLAN:                                                           Diagnoses and all orders for this visit:     Upper respiratory tract infection, unspecified type  -     ketotifen (ZADITOR/REFRESH ANTI-ITCH) 0.025 % ophthalmic solution; Place 1 drop into both eyes 2 times daily for 14 days   Wait to hear form your company to see if you are able to take OTC Zyrtec. If you are able to take zyrtec please take 10 mg daily. It may take 1 to 2 weeks to see the full benefits of this medication.    Blanka Del Rio NP  [facility]  [phone]     For any issues my office # is [phone]              Physician Attestation   I, Damian Suero, oversaw care given to Branden Toth as part of a shared visit.  I have reviewed and discussed with the advanced practice provider their history, physical and plan.    I personally reviewed the vital signs and medications.    My key history or physical exam findings are incorporated into the documentation.    I agree with the management decisions as listed in documentation above.    Damian Suero

## 2019-08-23 ENCOUNTER — PRE VISIT (OUTPATIENT)
Dept: MATERNAL FETAL MEDICINE | Facility: CLINIC | Age: 34
End: 2019-08-23

## 2019-08-30 ENCOUNTER — HOSPITAL ENCOUNTER (OUTPATIENT)
Dept: ULTRASOUND IMAGING | Facility: CLINIC | Age: 34
Discharge: HOME | End: 2019-08-30
Attending: OBSTETRICS & GYNECOLOGY | Admitting: OBSTETRICS & GYNECOLOGY
Payer: COMMERCIAL

## 2019-08-30 ENCOUNTER — OFFICE VISIT (OUTPATIENT)
Dept: MATERNAL FETAL MEDICINE | Facility: CLINIC | Age: 34
End: 2019-08-30
Attending: OBSTETRICS & GYNECOLOGY
Payer: COMMERCIAL

## 2019-08-30 DIAGNOSIS — O09.522 MULTIGRAVIDA OF ADVANCED MATERNAL AGE IN SECOND TRIMESTER: Primary | ICD-10-CM

## 2019-08-30 DIAGNOSIS — O09.522 SUPERVISION OF ELDERLY MULTIGRAVIDA IN SECOND TRIMESTER: Primary | ICD-10-CM

## 2019-08-30 DIAGNOSIS — O26.90 PREGNANCY RELATED CONDITION, ANTEPARTUM: ICD-10-CM

## 2019-08-30 PROCEDURE — 96040 ZZH GENETIC COUNSELING, EACH 30 MINUTES: CPT | Mod: ZF | Performed by: GENETIC COUNSELOR, MS

## 2019-08-30 PROCEDURE — 76811 OB US DETAILED SNGL FETUS: CPT

## 2019-08-30 NOTE — PROGRESS NOTES
"Please see \"Imaging\" tab under \"Chart Review\" for details of today's US.    Moira Lacy, DO    "

## 2019-09-29 ENCOUNTER — HEALTH MAINTENANCE LETTER (OUTPATIENT)
Age: 34
End: 2019-09-29

## 2019-11-23 ENCOUNTER — HOSPITAL ENCOUNTER (OUTPATIENT)
Facility: CLINIC | Age: 34
End: 2019-11-23
Admitting: OBSTETRICS & GYNECOLOGY
Payer: COMMERCIAL

## 2019-11-23 ENCOUNTER — HOSPITAL ENCOUNTER (OUTPATIENT)
Facility: CLINIC | Age: 34
Discharge: HOME | End: 2019-11-23
Attending: OBSTETRICS & GYNECOLOGY | Admitting: OBSTETRICS & GYNECOLOGY
Payer: COMMERCIAL

## 2019-11-23 VITALS
HEIGHT: 66 IN | WEIGHT: 175 LBS | DIASTOLIC BLOOD PRESSURE: 80 MMHG | BODY MASS INDEX: 28.12 KG/M2 | SYSTOLIC BLOOD PRESSURE: 127 MMHG | TEMPERATURE: 97.8 F

## 2019-11-23 DIAGNOSIS — N90.89 VULVAR LESION: Primary | ICD-10-CM

## 2019-11-23 LAB
RUPTURE OF FETAL MEMBRANES BY ROM PLUS: NEGATIVE
SPECIMEN SOURCE: NORMAL
WET PREP SPEC: NORMAL

## 2019-11-23 PROCEDURE — 87086 URINE CULTURE/COLONY COUNT: CPT | Performed by: OBSTETRICS & GYNECOLOGY

## 2019-11-23 PROCEDURE — 84112 EVAL AMNIOTIC FLUID PROTEIN: CPT | Performed by: OBSTETRICS & GYNECOLOGY

## 2019-11-23 PROCEDURE — G0463 HOSPITAL OUTPT CLINIC VISIT: HCPCS | Mod: 25

## 2019-11-23 PROCEDURE — 36415 COLL VENOUS BLD VENIPUNCTURE: CPT | Performed by: OBSTETRICS & GYNECOLOGY

## 2019-11-23 PROCEDURE — 87210 SMEAR WET MOUNT SALINE/INK: CPT | Performed by: OBSTETRICS & GYNECOLOGY

## 2019-11-23 PROCEDURE — 86696 HERPES SIMPLEX TYPE 2 TEST: CPT | Performed by: OBSTETRICS & GYNECOLOGY

## 2019-11-23 PROCEDURE — 87529 HSV DNA AMP PROBE: CPT | Performed by: OBSTETRICS & GYNECOLOGY

## 2019-11-23 PROCEDURE — 25000132 ZZH RX MED GY IP 250 OP 250 PS 637: Performed by: OBSTETRICS & GYNECOLOGY

## 2019-11-23 PROCEDURE — 86694 HERPES SIMPLEX NES ANTBDY: CPT | Performed by: OBSTETRICS & GYNECOLOGY

## 2019-11-23 PROCEDURE — 59025 FETAL NON-STRESS TEST: CPT

## 2019-11-23 PROCEDURE — 86695 HERPES SIMPLEX TYPE 1 TEST: CPT | Performed by: OBSTETRICS & GYNECOLOGY

## 2019-11-23 RX ORDER — ACYCLOVIR 200 MG/1
400 CAPSULE ORAL EVERY 8 HOURS
Qty: 84 CAPSULE | Refills: 3 | Status: ON HOLD | OUTPATIENT
Start: 2019-11-23 | End: 2020-02-04

## 2019-11-23 RX ORDER — ACYCLOVIR 200 MG/1
400 CAPSULE ORAL ONCE
Status: COMPLETED | OUTPATIENT
Start: 2019-11-23 | End: 2019-11-23

## 2019-11-23 RX ORDER — ONDANSETRON 2 MG/ML
4 INJECTION INTRAMUSCULAR; INTRAVENOUS EVERY 6 HOURS PRN
Status: DISCONTINUED | OUTPATIENT
Start: 2019-11-23 | End: 2019-11-23 | Stop reason: HOSPADM

## 2019-11-23 RX ADMIN — ACYCLOVIR 400 MG: 200 CAPSULE ORAL at 20:51

## 2019-11-23 ASSESSMENT — MIFFLIN-ST. JEOR: SCORE: 1510.54

## 2019-11-24 LAB
BACTERIA SPEC CULT: NORMAL
HSV1 IGG SERPL QL IA: 0.3 AI (ref 0–0.8)
HSV2 IGG SERPL QL IA: <0.2 AI (ref 0–0.8)
Lab: NORMAL
SPECIMEN SOURCE: NORMAL

## 2019-11-24 NOTE — DISCHARGE INSTRUCTIONS
Discharge Instruction for Undelivered Patients      You were seen for: vaginal pain  We Consulted: Dr Souza  You had (Test or Medicine): fetal and uterine monitoring], lab work (will discusse results at clinic appointment)    Diet:   Drink 8 to 12 glasses of liquids (milk, juice, water) every day.  You may eat meals and snacks.     Activity:  Count fetal kicks everyday (see handout)  Call your doctor or nurse midwife if your baby is moving less than usual.     Call your provider if you notice:  Swelling in your face or increased swelling in your hands or legs.  Headaches that are not relieved by Tylenol (acetaminophen).  Changes in your vision (blurring: seeing spots or stars.)  Nausea (sick to your stomach) and vomiting (throwing up).   Weight gain of 5 pounds or more per week.  Heartburn that doesn't go away.  Signs of bladder infection: pain when you urinate (use the toilet), need to go more often and more urgently.  The bag of loza (rupture of membranes) breaks, or you notice leaking in your underwear.  Bright red blood in your underwear.  Abdominal (lower belly) or stomach pain.  Second (plus) baby: Contractions (tightening) less than 10 minutes apart and getting stronger.  Increased vaginal pain.      Follow-up:  Make an appointment to be seen in the clinic sometime this week.

## 2019-11-24 NOTE — PROVIDER NOTIFICATION
Dr Souza in department and notified of pt's symptoms. New orders received for a UA, rom plus, wet prep and herpes test. Pt aware.

## 2019-11-24 NOTE — PROGRESS NOTES
"33yo  at 30+wks presented with pinkish discharge and vulvar pain.  Seen for routine visit on Thurs, no exam or symptoms  On Thurs, she and her  used some finger play but no toys.  Friday she came into the office of vulvar pain, given some lidocaine jelly and silvadine cream for some chaffing.  Worsening pain overnight and today, kept her bedridden, now burning when she urinates or sits down  /80   Temp 97.8  F (36.6  C) (Temporal)   Ht 1.676 m (5' 6\")   Wt 79.4 kg (175 lb)   LMP 2019   BMI 28.25 kg/m    Vulva show hemorrhagic edges of her upper labia minora bilaterally but visible excoriated, ulcerated area on inner right labia minora.  No signs of infection.  SSE negative x 3  Amnisure negative  Wet prep no clue cells or yeast  HSV titers and culture pending  UC less than 4x/hour  FHT category I  A.  IUP at 30+wks with vulvar lesions suggestive of HSV but cannot rule out trauma.  Pain is biggest issue.  Is able to void.  P.  Start acyclovir 400mg TID while awaiting HSV culture and titers  Discussed tylenol, ice, lidocaine jelly, sitz baths for pain control, offered narcotics  Followup this week with Dr. Pelaez./[place]  "

## 2019-11-24 NOTE — PLAN OF CARE
Pt presents to triage c/o pinkish vaginal discharge. She states she was seen in the clinic yesterday for vaginal discomfort. She's been putting topical cream on with a q-tip and reports that she noted pink discharge on the q-tip today. She reports her pain has been significantly worse today. She denies ctx or cramping, or vaginal bleeding. She states it burns to urinate but only on the outside of her vagina. She reports her baby has been active. Verbal consent received to place external monitors. Prenatal reviewed. Admission info taken.

## 2019-11-24 NOTE — PLAN OF CARE
Dr Souza at bedside at 2000. Received order to give acyclovir po x1, then send pt home with prescription for acyclovir, then discharge to home. PO acyclovir given. Discharge instructions reviewed, Rx given. Pt left ambulatory at 2056.

## 2019-11-24 NOTE — PLAN OF CARE
Dr Souza at pt's bedside to evaluate. Dr Souza performed rom plus, wet prep and herpes test. Specimens sent to lab.

## 2019-11-25 LAB
HSV1 DNA SPEC QL NAA+PROBE: NEGATIVE
HSV2 DNA SPEC QL NAA+PROBE: NEGATIVE
SPECIMEN SOURCE: NORMAL

## 2019-11-26 LAB — HSV 1+2 IGM SER-IMP: 0.49 INDEX VALUE (ref 0–0.89)

## 2020-01-08 LAB — GROUP B STREP PCR: NORMAL

## 2020-02-04 ENCOUNTER — HOSPITAL ENCOUNTER (INPATIENT)
Facility: CLINIC | Age: 35
LOS: 2 days | Discharge: HOME HEALTH | End: 2020-02-06
Attending: OBSTETRICS & GYNECOLOGY | Admitting: OBSTETRICS & GYNECOLOGY
Payer: COMMERCIAL

## 2020-02-04 LAB
ABO + RH BLD: NORMAL
ABO + RH BLD: NORMAL
AMPHETAMINES UR QL SCN: NEGATIVE
BASOPHILS # BLD AUTO: 0 10E9/L (ref 0–0.2)
BASOPHILS NFR BLD AUTO: 0.1 %
BLOOD BANK CMNT PATIENT-IMP: NORMAL
BLOOD BANK CMNT PATIENT-IMP: NORMAL
CANNABINOIDS UR QL: NEGATIVE
COCAINE UR QL: NEGATIVE
DIFFERENTIAL METHOD BLD: ABNORMAL
EOSINOPHIL # BLD AUTO: 0 10E9/L (ref 0–0.7)
EOSINOPHIL NFR BLD AUTO: 0.1 %
ERYTHROCYTE [DISTWIDTH] IN BLOOD BY AUTOMATED COUNT: 13 % (ref 10–15)
HCT VFR BLD AUTO: 35.7 % (ref 35–47)
HGB BLD-MCNC: 12 G/DL (ref 11.7–15.7)
IMM GRANULOCYTES # BLD: 0.3 10E9/L (ref 0–0.4)
IMM GRANULOCYTES NFR BLD: 1.4 %
LYMPHOCYTES # BLD AUTO: 2 10E9/L (ref 0.8–5.3)
LYMPHOCYTES NFR BLD AUTO: 10 %
MCH RBC QN AUTO: 31.3 PG (ref 26.5–33)
MCHC RBC AUTO-ENTMCNC: 33.6 G/DL (ref 31.5–36.5)
MCV RBC AUTO: 93 FL (ref 78–100)
MONOCYTES # BLD AUTO: 0.7 10E9/L (ref 0–1.3)
MONOCYTES NFR BLD AUTO: 3.6 %
NEUTROPHILS # BLD AUTO: 17 10E9/L (ref 1.6–8.3)
NEUTROPHILS NFR BLD AUTO: 84.8 %
NRBC # BLD AUTO: 0 10*3/UL
NRBC BLD AUTO-RTO: 0 /100
OPIATES UR QL SCN: NEGATIVE
PCP UR QL SCN: NEGATIVE
PLATELET # BLD AUTO: 167 10E9/L (ref 150–450)
RBC # BLD AUTO: 3.83 10E12/L (ref 3.8–5.2)
SPECIMEN EXP DATE BLD: NORMAL
WBC # BLD AUTO: 20.1 10E9/L (ref 4–11)

## 2020-02-04 PROCEDURE — 12000035 ZZH R&B POSTPARTUM

## 2020-02-04 PROCEDURE — 0KQM0ZZ REPAIR PERINEUM MUSCLE, OPEN APPROACH: ICD-10-PCS | Performed by: OBSTETRICS & GYNECOLOGY

## 2020-02-04 PROCEDURE — 86901 BLOOD TYPING SEROLOGIC RH(D): CPT | Performed by: OBSTETRICS & GYNECOLOGY

## 2020-02-04 PROCEDURE — 25000128 H RX IP 250 OP 636: Performed by: OBSTETRICS & GYNECOLOGY

## 2020-02-04 PROCEDURE — 72200001 ZZH LABOR CARE VAGINAL DELIVERY SINGLE

## 2020-02-04 PROCEDURE — 36415 COLL VENOUS BLD VENIPUNCTURE: CPT | Performed by: OBSTETRICS & GYNECOLOGY

## 2020-02-04 PROCEDURE — 86780 TREPONEMA PALLIDUM: CPT | Performed by: OBSTETRICS & GYNECOLOGY

## 2020-02-04 PROCEDURE — 25000132 ZZH RX MED GY IP 250 OP 250 PS 637: Performed by: OBSTETRICS & GYNECOLOGY

## 2020-02-04 PROCEDURE — 86900 BLOOD TYPING SEROLOGIC ABO: CPT | Performed by: OBSTETRICS & GYNECOLOGY

## 2020-02-04 PROCEDURE — 85025 COMPLETE CBC W/AUTO DIFF WBC: CPT | Performed by: OBSTETRICS & GYNECOLOGY

## 2020-02-04 PROCEDURE — 80307 DRUG TEST PRSMV CHEM ANLYZR: CPT | Performed by: OBSTETRICS & GYNECOLOGY

## 2020-02-04 RX ORDER — OXYTOCIN 10 [USP'U]/ML
10 INJECTION, SOLUTION INTRAMUSCULAR; INTRAVENOUS
Status: DISCONTINUED | OUTPATIENT
Start: 2020-02-04 | End: 2020-02-06 | Stop reason: HOSPADM

## 2020-02-04 RX ORDER — LIDOCAINE HYDROCHLORIDE 10 MG/ML
INJECTION, SOLUTION INFILTRATION; PERINEURAL
Status: DISCONTINUED
Start: 2020-02-04 | End: 2020-02-04 | Stop reason: HOSPADM

## 2020-02-04 RX ORDER — CARBOPROST TROMETHAMINE 250 UG/ML
250 INJECTION, SOLUTION INTRAMUSCULAR
Status: DISCONTINUED | OUTPATIENT
Start: 2020-02-04 | End: 2020-02-04

## 2020-02-04 RX ORDER — AMOXICILLIN 250 MG
2 CAPSULE ORAL 2 TIMES DAILY
Status: DISCONTINUED | OUTPATIENT
Start: 2020-02-04 | End: 2020-02-06 | Stop reason: HOSPADM

## 2020-02-04 RX ORDER — NALOXONE HYDROCHLORIDE 0.4 MG/ML
.1-.4 INJECTION, SOLUTION INTRAMUSCULAR; INTRAVENOUS; SUBCUTANEOUS
Status: DISCONTINUED | OUTPATIENT
Start: 2020-02-04 | End: 2020-02-06 | Stop reason: HOSPADM

## 2020-02-04 RX ORDER — OXYCODONE AND ACETAMINOPHEN 5; 325 MG/1; MG/1
1 TABLET ORAL
Status: DISCONTINUED | OUTPATIENT
Start: 2020-02-04 | End: 2020-02-04

## 2020-02-04 RX ORDER — MAGNESIUM CARBONATE
1 POWDER (GRAM) MISCELLANEOUS
COMMUNITY

## 2020-02-04 RX ORDER — ACETAMINOPHEN 325 MG/1
650 TABLET ORAL EVERY 4 HOURS PRN
Status: DISCONTINUED | OUTPATIENT
Start: 2020-02-04 | End: 2020-02-04

## 2020-02-04 RX ORDER — IBUPROFEN 400 MG/1
800 TABLET, FILM COATED ORAL EVERY 6 HOURS PRN
Status: DISCONTINUED | OUTPATIENT
Start: 2020-02-04 | End: 2020-02-06 | Stop reason: HOSPADM

## 2020-02-04 RX ORDER — OXYTOCIN/0.9 % SODIUM CHLORIDE 30/500 ML
100 PLASTIC BAG, INJECTION (ML) INTRAVENOUS CONTINUOUS
Status: DISCONTINUED | OUTPATIENT
Start: 2020-02-04 | End: 2020-02-06 | Stop reason: HOSPADM

## 2020-02-04 RX ORDER — ONDANSETRON 2 MG/ML
4 INJECTION INTRAMUSCULAR; INTRAVENOUS EVERY 6 HOURS PRN
Status: DISCONTINUED | OUTPATIENT
Start: 2020-02-04 | End: 2020-02-04

## 2020-02-04 RX ORDER — ACETAMINOPHEN 325 MG/1
650 TABLET ORAL EVERY 4 HOURS PRN
Status: DISCONTINUED | OUTPATIENT
Start: 2020-02-04 | End: 2020-02-06 | Stop reason: HOSPADM

## 2020-02-04 RX ORDER — OXYTOCIN/0.9 % SODIUM CHLORIDE 30/500 ML
340 PLASTIC BAG, INJECTION (ML) INTRAVENOUS CONTINUOUS PRN
Status: DISCONTINUED | OUTPATIENT
Start: 2020-02-04 | End: 2020-02-06 | Stop reason: HOSPADM

## 2020-02-04 RX ORDER — IBUPROFEN 400 MG/1
800 TABLET, FILM COATED ORAL
Status: DISCONTINUED | OUTPATIENT
Start: 2020-02-04 | End: 2020-02-04

## 2020-02-04 RX ORDER — NALOXONE HYDROCHLORIDE 0.4 MG/ML
.1-.4 INJECTION, SOLUTION INTRAMUSCULAR; INTRAVENOUS; SUBCUTANEOUS
Status: DISCONTINUED | OUTPATIENT
Start: 2020-02-04 | End: 2020-02-04

## 2020-02-04 RX ORDER — SODIUM CHLORIDE, SODIUM LACTATE, POTASSIUM CHLORIDE, CALCIUM CHLORIDE 600; 310; 30; 20 MG/100ML; MG/100ML; MG/100ML; MG/100ML
INJECTION, SOLUTION INTRAVENOUS CONTINUOUS
Status: DISCONTINUED | OUTPATIENT
Start: 2020-02-04 | End: 2020-02-04

## 2020-02-04 RX ORDER — OXYTOCIN/0.9 % SODIUM CHLORIDE 30/500 ML
100-340 PLASTIC BAG, INJECTION (ML) INTRAVENOUS CONTINUOUS PRN
Status: DISCONTINUED | OUTPATIENT
Start: 2020-02-04 | End: 2020-02-04

## 2020-02-04 RX ORDER — METHYLERGONOVINE MALEATE 0.2 MG/ML
200 INJECTION INTRAVENOUS
Status: DISCONTINUED | OUTPATIENT
Start: 2020-02-04 | End: 2020-02-04

## 2020-02-04 RX ORDER — AMOXICILLIN 250 MG
1 CAPSULE ORAL 2 TIMES DAILY
Status: DISCONTINUED | OUTPATIENT
Start: 2020-02-04 | End: 2020-02-06 | Stop reason: HOSPADM

## 2020-02-04 RX ORDER — LANSOPRAZOLE 30 MG/1
30 CAPSULE, DELAYED RELEASE ORAL DAILY
Status: ON HOLD | COMMUNITY
End: 2020-02-05

## 2020-02-04 RX ORDER — OXYTOCIN 10 [USP'U]/ML
10 INJECTION, SOLUTION INTRAMUSCULAR; INTRAVENOUS
Status: COMPLETED | OUTPATIENT
Start: 2020-02-04 | End: 2020-02-04

## 2020-02-04 RX ORDER — BISACODYL 10 MG
10 SUPPOSITORY, RECTAL RECTAL DAILY PRN
Status: DISCONTINUED | OUTPATIENT
Start: 2020-02-06 | End: 2020-02-06 | Stop reason: HOSPADM

## 2020-02-04 RX ADMIN — IBUPROFEN 800 MG: 400 TABLET ORAL at 09:41

## 2020-02-04 RX ADMIN — SENNOSIDES AND DOCUSATE SODIUM 1 TABLET: 8.6; 5 TABLET ORAL at 21:35

## 2020-02-04 RX ADMIN — ACETAMINOPHEN 650 MG: 325 TABLET, FILM COATED ORAL at 22:42

## 2020-02-04 RX ADMIN — IBUPROFEN 800 MG: 400 TABLET ORAL at 21:35

## 2020-02-04 RX ADMIN — ACETAMINOPHEN 650 MG: 325 TABLET, FILM COATED ORAL at 09:41

## 2020-02-04 RX ADMIN — ACETAMINOPHEN 650 MG: 325 TABLET, FILM COATED ORAL at 14:24

## 2020-02-04 RX ADMIN — OXYTOCIN 10 UNITS: 10 INJECTION, SOLUTION INTRAMUSCULAR; INTRAVENOUS at 08:35

## 2020-02-04 RX ADMIN — IBUPROFEN 800 MG: 400 TABLET ORAL at 15:42

## 2020-02-04 RX ADMIN — ACETAMINOPHEN 650 MG: 325 TABLET, FILM COATED ORAL at 18:38

## 2020-02-04 ASSESSMENT — MIFFLIN-ST. JEOR: SCORE: 1582.66

## 2020-02-04 NOTE — PLAN OF CARE
Pt is a Vaginal Delivery today at 0825. A/O. VSS. U/2, firm without massage. 2nd degree laceration- repaired. Pain controlled with tylenol, ibuprofen and ice. Breastfeeding well. Vdg adequately. Up ad sharmaine. Bonding well with baby. Dad- Alex, attentive at bedside. Plan for video education, depression screening, and birth certificate.

## 2020-02-04 NOTE — L&D DELIVERY NOTE
Delivery by Dr. Alonzo (precipitous)  2nd degree perineal tear repaired by myself under local anesthetic   c.c.

## 2020-02-04 NOTE — PLAN OF CARE
Patient arrived to room 414 via wheelchair with infant in arms. Patient and family oriented to room and floor. Call light within reach. Infant safety and use of bulb syringe discussed. Encouraged to call with questions and concerns.

## 2020-02-04 NOTE — OP NOTE
Procedure Date: 2020      PREOPERATIVE DIAGNOSIS:  2nd degree tear      POSTOPERATIVE DIAGNOSIS:  same     PROCEDURE PERFORMED:  Perineal repair.      DESCRIPTION OF PROCEDURE:  This patient presented at 41 weeks' gestation in labor, delivered precipitously in the elevator while still closed.  The baby could be heard crying.  Dr. Alonzo did the actual delivery.  Upon my arrival, the baby was delivered and placenta was delivered.  There was a second-degree perineal tear present.  Local anesthetic was applied in the area and the second-degree tear repaired in the usual manner with 2-0 and 3-0 chromic catgut.  The total estimated blood loss for the procedure was approximately 200 mL.  The patient and infant left the delivery suite in good condition.         ETELVINA SWEENEY MD             D: 2020   T: 2020   MT: TRES      Name:     CATARINO FREEMAN   MRN:      [id number]        Account:        [id number]   :      1985           Procedure Date: 2020      Document: [id number]

## 2020-02-04 NOTE — L&D DELIVERY NOTE
Sadie Soto   Admission date: 2020  Delivery date:  20  Place of delivery: [facility]    The patient is a 35 year old  at 41w0d  wks gestation admitted to labor and delivery for precipitous delivery.  She is an established patient with OGI.    States that she started feeling contractions this morning around 0600.  They were short and mild in intensity.  They started to increase and they headed to the hospital around 0730.  When on the way, her water broke and she had the uncontrollable urge to push.    She was wheeled into labor and delivery by her spouse.  At this time, she stated that the baby was out.  Audible crying.  She was put into the labor and delivery room.  Pants were removed, delivery had been completed, and baby was placed on the maternal chest.  Pt was moved to labor and delivery bed. Cord was clamped and cut.  Placenta spontaneously delivered at 0832.        IM pitocin was given.  The vagina were inspected.  There was a second degree perineal laceration.  While waiting on lidocaine, Dr. Rice was present.  The repair to be completed by primary team.      Gigi Alonzo MD

## 2020-02-04 NOTE — PLAN OF CARE
Pt arrived on the unit at 0824 in a wheelchair. She was stating that baby's head was delivered and baby was crying. Audible crying was heard through her pants. She was wheeled into 232, and Dr. Alonzo finished delivering the baby at 0825. Pt was then transferred into the labor bed.    Placenta delivered spontaneously at 0832, and IM pitocin was given as soon as available, following the placenta delivery.    After the delivery, Pt stated that she started david mildly and irregular at about 6am, and that they got a little stronger when they headed here at about 0730. She also stated that she did bear down (due to an uncontrollable urge to push) a few times between the car and the elevator.  She stated that she felt as though the head was  in the car, and the head delivered in the elevator.    Dr. Rice came to room as Dr. Alonzo was inspecting for any lacerations, so he completed the repair with the use of lidocaine.    Unable to calculate QBL due to circumstances, so EBL was calculated at 200cc    Admission database was completed and prenatal record reviewed.

## 2020-02-04 NOTE — LACTATION NOTE
EFFIE stopped for initial visit with Cathleen but there were a lot of visitors. EFFIE offered to stop back later.

## 2020-02-05 LAB — T PALLIDUM AB SER QL: NONREACTIVE

## 2020-02-05 PROCEDURE — 25000132 ZZH RX MED GY IP 250 OP 250 PS 637: Performed by: OBSTETRICS & GYNECOLOGY

## 2020-02-05 PROCEDURE — 12000035 ZZH R&B POSTPARTUM

## 2020-02-05 RX ADMIN — IBUPROFEN 800 MG: 400 TABLET ORAL at 09:46

## 2020-02-05 RX ADMIN — SENNOSIDES AND DOCUSATE SODIUM 1 TABLET: 8.6; 5 TABLET ORAL at 20:44

## 2020-02-05 RX ADMIN — SENNOSIDES AND DOCUSATE SODIUM 1 TABLET: 8.6; 5 TABLET ORAL at 07:42

## 2020-02-05 RX ADMIN — ACETAMINOPHEN 650 MG: 325 TABLET, FILM COATED ORAL at 07:42

## 2020-02-05 RX ADMIN — ACETAMINOPHEN 650 MG: 325 TABLET, FILM COATED ORAL at 03:41

## 2020-02-05 RX ADMIN — ACETAMINOPHEN 650 MG: 325 TABLET, FILM COATED ORAL at 11:45

## 2020-02-05 RX ADMIN — ACETAMINOPHEN 650 MG: 325 TABLET, FILM COATED ORAL at 16:12

## 2020-02-05 RX ADMIN — IBUPROFEN 800 MG: 400 TABLET ORAL at 16:12

## 2020-02-05 RX ADMIN — IBUPROFEN 800 MG: 400 TABLET ORAL at 03:41

## 2020-02-05 NOTE — PLAN OF CARE
Pt is post vaginal delivery 2/4 @ 0825. A/O. VSS. Pain controlled with tylenol, ibuprofen and tucks pads. Lanolin to nipples, breastfeeding fair after baby's circumcision due to infant lethargy. Tolerating regular diet. Vdg adequately. Passing flatus, -BM. Up ad sharmaine independently in room. Bonding well with infant. , Alex, attentive at bedside. Plan for discharge to home tomorrow pending progress.

## 2020-02-05 NOTE — PROGRESS NOTES
"PPD#1    S: Doing well. Pain is minimal. Voiding and ambulating. Bleeding is light.    O: /76   Pulse 70   Temp 98.2  F (36.8  C) (Axillary)   Resp 16   Ht 1.676 m (5' 6\")   Wt 87.1 kg (192 lb)   LMP 2019   Breastfeeding Unknown   BMI 30.99 kg/m    Gen - NAD  Abd - FF, NT  Ext - NT    A/P: 34yo PPD#1 s/p   1. Routine cares  2. Anticipate discharge tomorrow    Andree Pelaez MD    "

## 2020-02-05 NOTE — LACTATION NOTE
"Initial visit with Cathleen, JACOBY, and baby Fabio. This is Cathleen's second baby, she breast fed her first for 14 mos. Cathleen has been working on latching infant for last 40 minutes she tells EFFIE. Cathleen shares she used a shield with her first d/t smoother nipples.  provided a 24 mm shield, helped Cathleen change hold to cross cradle with strong infant support vs cradle. With the shield and good cross cradle support, infant latched well, nutritive suck pattern observed. We discussed weaning from the shield once strong breast feeding is established, recommended following up with outpatient lactation at Peds provider.    Reviewed general breastfeeding information along with the breastfeeding section in [facility] patient education booklet. Parent's educated to typical  feeding patterns and how to know when infant is done at the breast. Encouraged skin to skin prior to breastfeeding to promote better breastfeeding outcomes. We also discussed \"cluster-feeding ;\" what it is and when to expect it. Questions answered regarding pumping and physiology of milk supply and production.    Feeding plan: Recommend unlimited, exclusive, and frequent breast feedings (reviewed early feeding cues): At least 8 - 12 times every 24 hours. Recommended rooming in. Instructed in hand expression. Avoid pacifiers and supplementation with formula unless medically indicated.     Will follow as needed.    Anastasiia Paulino RN, Lactation Educator   "

## 2020-02-05 NOTE — PLAN OF CARE
Vital signs stable. Postpartum assessment WDL. Pain controlled with ibuprofen and tylenol. Patient voiding without difficulty. Breastfeeding on cue with minimal assist, using shield. Patient and infant bonding well. Will continue with current plan of care.

## 2020-02-06 VITALS
BODY MASS INDEX: 30.86 KG/M2 | HEIGHT: 66 IN | HEART RATE: 73 BPM | RESPIRATION RATE: 16 BRPM | WEIGHT: 192 LBS | TEMPERATURE: 98.1 F | DIASTOLIC BLOOD PRESSURE: 76 MMHG | SYSTOLIC BLOOD PRESSURE: 116 MMHG

## 2020-02-06 PROCEDURE — 25000132 ZZH RX MED GY IP 250 OP 250 PS 637: Performed by: OBSTETRICS & GYNECOLOGY

## 2020-02-06 RX ADMIN — IBUPROFEN 800 MG: 400 TABLET ORAL at 00:00

## 2020-02-06 RX ADMIN — ACETAMINOPHEN 650 MG: 325 TABLET, FILM COATED ORAL at 06:29

## 2020-02-06 RX ADMIN — IBUPROFEN 800 MG: 400 TABLET ORAL at 06:29

## 2020-02-06 RX ADMIN — SENNOSIDES AND DOCUSATE SODIUM 1 TABLET: 8.6; 5 TABLET ORAL at 08:54

## 2020-02-06 RX ADMIN — ACETAMINOPHEN 650 MG: 325 TABLET, FILM COATED ORAL at 00:00

## 2020-02-06 NOTE — PROGRESS NOTES
"PPD#2    S: Doing well. Pain is minimal. Voiding and ambulating. Bleeding is light.    O: /74   Pulse 69   Temp 98.1  F (36.7  C) (Oral)   Resp 16   Ht 1.676 m (5' 6\")   Wt 87.1 kg (192 lb)   LMP 2019   Breastfeeding Unknown   BMI 30.99 kg/m    Gen - NAD  Abd - FF, NT  Ext - NT    A/P: 36yo PPD#2 s/p precipitous   1. Routine cares. Encourage ambulation  2. Anticipatory guidance given.    3. She is Rh negative.  The baby is also Rh negative so Rhogam is not needed.  4. Anticipate discharge today.  Orders and Rx's done previously.    Katherin Beyer MD  Obstetrics, Gynecology and Infertility        "

## 2020-02-06 NOTE — PLAN OF CARE
VSS.  Fundus firm, midline U/2 with scant flow. Pain well controlled with tylenol/ibuprofen. Up independently in room.  Working on breastfeeding  and  cares. Progressing per care plan. Continue to monitor and notify MD as needed.

## 2020-02-06 NOTE — LACTATION NOTE
Routine visit with Cathleen and baby.  Baby boy able to latch on deeply lips flanged at time of visit of the left breast.  Reviewed hand expression.  Getting ready for discharge.  Plan: Watch for feeding cues and feed every 2-3 hours and/or on demand. Continue to use feeding log to track intake and appropriate voids and stools. Take feeding log to first follow up appointment or weight check. Encourage skin to skin to promote frequent feedings, thermoregulation and bonding. Follow-up with healthcare provider or lactation consultant for questions or concerns.    Questions answered regarding pumping and physiology of milk supply and production.  Has a breast pump for home and has occasionnaly been using the shield.  Will follow up with LC.   Instructed on signs/symptoms of engorgement/ plugged ducts and mastitis.  Instructed on comfort measures and when to call MD.   Continues to nurse well per mom. No further questions at this time.   Will follow as needed.   Akanksha Irving BSN, RN, PHN, RNC-MNN, IBCLC

## 2021-01-14 ENCOUNTER — HEALTH MAINTENANCE LETTER (OUTPATIENT)
Age: 36
End: 2021-01-14

## 2021-10-23 ENCOUNTER — HEALTH MAINTENANCE LETTER (OUTPATIENT)
Age: 36
End: 2021-10-23

## 2022-02-12 ENCOUNTER — HEALTH MAINTENANCE LETTER (OUTPATIENT)
Age: 37
End: 2022-02-12

## 2022-10-10 ENCOUNTER — HEALTH MAINTENANCE LETTER (OUTPATIENT)
Age: 37
End: 2022-10-10

## 2023-03-25 ENCOUNTER — HEALTH MAINTENANCE LETTER (OUTPATIENT)
Age: 38
End: 2023-03-25

## 2023-05-18 PROCEDURE — 88305 TISSUE EXAM BY PATHOLOGIST: CPT | Mod: TC,ORL | Performed by: OTOLARYNGOLOGY

## 2023-05-22 ENCOUNTER — LAB REQUISITION (OUTPATIENT)
Dept: LAB | Facility: CLINIC | Age: 38
End: 2023-05-22
Payer: COMMERCIAL

## 2023-05-22 DIAGNOSIS — R51.9 HEADACHE, UNSPECIFIED: ICD-10-CM

## 2023-05-22 DIAGNOSIS — J30.9 ALLERGIC RHINITIS, UNSPECIFIED: ICD-10-CM

## 2023-05-23 LAB
PATH REPORT.COMMENTS IMP SPEC: NORMAL
PATH REPORT.COMMENTS IMP SPEC: NORMAL
PATH REPORT.FINAL DX SPEC: NORMAL
PATH REPORT.GROSS SPEC: NORMAL
PATH REPORT.MICROSCOPIC SPEC OTHER STN: NORMAL
PATH REPORT.RELEVANT HX SPEC: NORMAL
PHOTO IMAGE: NORMAL

## 2023-05-23 PROCEDURE — 88305 TISSUE EXAM BY PATHOLOGIST: CPT | Mod: 26 | Performed by: PATHOLOGY
